# Patient Record
Sex: FEMALE | Race: WHITE | Employment: UNEMPLOYED | URBAN - METROPOLITAN AREA
[De-identification: names, ages, dates, MRNs, and addresses within clinical notes are randomized per-mention and may not be internally consistent; named-entity substitution may affect disease eponyms.]

---

## 2017-07-25 ENCOUNTER — HOSPITAL ENCOUNTER (OUTPATIENT)
Dept: MAMMOGRAPHY | Age: 62
Discharge: HOME | End: 2017-07-25
Attending: OBSTETRICS & GYNECOLOGY | Admitting: OBSTETRICS & GYNECOLOGY

## 2017-07-25 DIAGNOSIS — Z12.31 ENCOUNTER FOR SCREENING MAMMOGRAM FOR BREAST CANCER: ICD-10-CM

## 2017-10-24 ENCOUNTER — OFFICE VISIT (OUTPATIENT)
Dept: INTERNAL MEDICINE CLINIC | Age: 62
End: 2017-10-24

## 2017-10-24 ENCOUNTER — HOSPITAL ENCOUNTER (OUTPATIENT)
Dept: GENERAL RADIOLOGY | Age: 62
Discharge: HOME | End: 2017-10-24
Attending: INTERNAL MEDICINE | Admitting: INTERNAL MEDICINE

## 2017-10-24 VITALS
OXYGEN SATURATION: 96 % | HEART RATE: 82 BPM | WEIGHT: 212 LBS | HEIGHT: 63 IN | BODY MASS INDEX: 37.56 KG/M2 | DIASTOLIC BLOOD PRESSURE: 82 MMHG | SYSTOLIC BLOOD PRESSURE: 122 MMHG

## 2017-10-24 DIAGNOSIS — Z00.00 WELL ADULT EXAM: Primary | ICD-10-CM

## 2017-10-24 DIAGNOSIS — Z12.11 SCREEN FOR COLON CANCER: ICD-10-CM

## 2017-10-24 DIAGNOSIS — L98.9 SYMPTOMATIC LESION OF SKIN: ICD-10-CM

## 2017-10-24 DIAGNOSIS — Z11.59 SCREENING FOR VIRAL DISEASE: ICD-10-CM

## 2017-10-24 DIAGNOSIS — Z00.00 ROUTINE GENERAL MEDICAL EXAMINATION AT A HEALTH CARE FACILITY: ICD-10-CM

## 2017-10-24 LAB
A/G RATIO: 1.5 (ref 1.1–2.2)
ALBUMIN SERPL-MCNC: 4 G/DL (ref 3.4–5)
ALP BLD-CCNC: 57 U/L (ref 40–129)
ALT SERPL-CCNC: 17 U/L (ref 10–40)
ANION GAP SERPL CALCULATED.3IONS-SCNC: 8 MMOL/L (ref 3–16)
AST SERPL-CCNC: 18 U/L (ref 15–37)
BILIRUB SERPL-MCNC: 0.5 MG/DL (ref 0–1)
BUN BLDV-MCNC: 16 MG/DL (ref 7–20)
CALCIUM SERPL-MCNC: 9 MG/DL (ref 8.3–10.6)
CHLORIDE BLD-SCNC: 102 MMOL/L (ref 99–110)
CHOLESTEROL, TOTAL: 206 MG/DL (ref 0–199)
CO2: 29 MMOL/L (ref 21–32)
CREAT SERPL-MCNC: 0.7 MG/DL (ref 0.6–1.2)
GFR AFRICAN AMERICAN: >60
GFR NON-AFRICAN AMERICAN: >60
GLOBULIN: 2.7 G/DL
GLUCOSE BLD-MCNC: 91 MG/DL (ref 70–99)
HDLC SERPL-MCNC: 41 MG/DL (ref 40–60)
HEPATITIS C ANTIBODY INTERPRETATION: NORMAL
LDL CHOLESTEROL CALCULATED: 146 MG/DL
POTASSIUM SERPL-SCNC: 4.1 MMOL/L (ref 3.5–5.1)
SODIUM BLD-SCNC: 139 MMOL/L (ref 136–145)
TOTAL PROTEIN: 6.7 G/DL (ref 6.4–8.2)
TRIGL SERPL-MCNC: 97 MG/DL (ref 0–150)
VLDLC SERPL CALC-MCNC: 19 MG/DL

## 2017-10-24 PROCEDURE — 99396 PREV VISIT EST AGE 40-64: CPT | Performed by: INTERNAL MEDICINE

## 2017-10-24 ASSESSMENT — ENCOUNTER SYMPTOMS
ABDOMINAL DISTENTION: 0
CHEST TIGHTNESS: 0
NAUSEA: 0
SHORTNESS OF BREATH: 0
COUGH: 0
VOMITING: 0
DIARRHEA: 0
WHEEZING: 0
BACK PAIN: 0
CONSTIPATION: 0

## 2017-10-24 ASSESSMENT — PATIENT HEALTH QUESTIONNAIRE - PHQ9
SUM OF ALL RESPONSES TO PHQ QUESTIONS 1-9: 0
SUM OF ALL RESPONSES TO PHQ9 QUESTIONS 1 & 2: 0
1. LITTLE INTEREST OR PLEASURE IN DOING THINGS: 0
2. FEELING DOWN, DEPRESSED OR HOPELESS: 0

## 2017-10-24 NOTE — PROGRESS NOTES
Subjective:      Patient ID: Amita Márquez is a 62 y.o. female. HPI    Here to establish care and for a well exam. No health concerns. Past Medical History, Medications, Social History, Family History, Health Maintenance and Labs have been reviewed with Amita Márquez. Review of Systems   Constitutional: Negative for unexpected weight change. Respiratory: Negative for cough, chest tightness, shortness of breath and wheezing. Cardiovascular: Negative for chest pain, palpitations and leg swelling. Gastrointestinal: Negative for abdominal distention, constipation, diarrhea, nausea and vomiting. Musculoskeletal: Negative for arthralgias, back pain and myalgias. Neurological: Negative for tremors and numbness. All other systems reviewed and are negative. Objective:   Physical Exam   Constitutional: She is oriented to person, place, and time. She appears well-developed and well-nourished. No distress. HENT:   Right Ear: External ear normal.   Left Ear: External ear normal.   Mouth/Throat: Oropharynx is clear and moist. No oropharyngeal exudate. Neck: Neck supple. No thyromegaly present. Cardiovascular: Normal rate, regular rhythm and normal heart sounds. Exam reveals no gallop and no friction rub. No murmur heard. Pulmonary/Chest: Effort normal and breath sounds normal. No respiratory distress. She has no wheezes. She has no rales. Abdominal: Soft. She exhibits no distension. There is no tenderness. There is no rebound and no guarding. Musculoskeletal: She exhibits no edema. Neurological: She is alert and oriented to person, place, and time. Skin: She is not diaphoretic. Two skin lesions right calf with no signs of infection       Assessment:      Radha Hoffman was seen today for annual exam.    Diagnoses and all orders for this visit:    Well adult exam  Encouraged exercise and healthy diet. F/u with ob/gyn and dentist regularly. Recommend eye exam.  Wears seat belt.

## 2018-07-20 ENCOUNTER — TELEPHONE (OUTPATIENT)
Dept: INTERNAL MEDICINE CLINIC | Age: 63
End: 2018-07-20

## 2018-07-20 DIAGNOSIS — Z12.11 SCREEN FOR COLON CANCER: Primary | ICD-10-CM

## 2018-07-23 ENCOUNTER — HOSPITAL ENCOUNTER (OUTPATIENT)
Dept: WOMENS IMAGING | Age: 63
Discharge: HOME | End: 2018-07-23
Payer: COMMERCIAL

## 2018-07-23 DIAGNOSIS — Z12.31 VISIT FOR SCREENING MAMMOGRAM: ICD-10-CM

## 2018-07-23 PROCEDURE — 77063 BREAST TOMOSYNTHESIS BI: CPT

## 2018-08-07 ENCOUNTER — OFFICE VISIT (OUTPATIENT)
Dept: INTERNAL MEDICINE CLINIC | Age: 63
End: 2018-08-07

## 2018-08-07 VITALS
WEIGHT: 216 LBS | HEART RATE: 88 BPM | DIASTOLIC BLOOD PRESSURE: 80 MMHG | SYSTOLIC BLOOD PRESSURE: 122 MMHG | OXYGEN SATURATION: 96 % | BODY MASS INDEX: 38.27 KG/M2 | HEIGHT: 63 IN

## 2018-08-07 DIAGNOSIS — Z12.11 SCREEN FOR COLON CANCER: ICD-10-CM

## 2018-08-07 DIAGNOSIS — Z00.00 WELL ADULT EXAM: Primary | ICD-10-CM

## 2018-08-07 DIAGNOSIS — Z23 NEED FOR VACCINATION: ICD-10-CM

## 2018-08-07 DIAGNOSIS — Z23 NEED FOR PROPHYLACTIC VACCINATION AND INOCULATION AGAINST VARICELLA: ICD-10-CM

## 2018-08-07 PROCEDURE — 90750 HZV VACC RECOMBINANT IM: CPT | Performed by: INTERNAL MEDICINE

## 2018-08-07 PROCEDURE — 99396 PREV VISIT EST AGE 40-64: CPT | Performed by: INTERNAL MEDICINE

## 2018-08-07 PROCEDURE — 90471 IMMUNIZATION ADMIN: CPT | Performed by: INTERNAL MEDICINE

## 2018-08-07 RX ORDER — ESTRADIOL 0.04 MG/D
PATCH TRANSDERMAL
Refills: 11 | COMMUNITY
Start: 2018-07-21

## 2018-11-02 ENCOUNTER — OFFICE VISIT (OUTPATIENT)
Dept: INTERNAL MEDICINE CLINIC | Age: 63
End: 2018-11-02
Payer: COMMERCIAL

## 2018-11-02 VITALS
TEMPERATURE: 98.5 F | BODY MASS INDEX: 37.21 KG/M2 | WEIGHT: 210 LBS | SYSTOLIC BLOOD PRESSURE: 130 MMHG | HEIGHT: 63 IN | DIASTOLIC BLOOD PRESSURE: 65 MMHG

## 2018-11-02 DIAGNOSIS — Z00.00 ROUTINE GENERAL MEDICAL EXAMINATION AT A HEALTH CARE FACILITY: ICD-10-CM

## 2018-11-02 DIAGNOSIS — R04.0 BLEEDING NOSE: Primary | ICD-10-CM

## 2018-11-02 PROCEDURE — 99213 OFFICE O/P EST LOW 20 MIN: CPT | Performed by: INTERNAL MEDICINE

## 2018-11-02 ASSESSMENT — PATIENT HEALTH QUESTIONNAIRE - PHQ9
SUM OF ALL RESPONSES TO PHQ QUESTIONS 1-9: 0
SUM OF ALL RESPONSES TO PHQ QUESTIONS 1-9: 0
2. FEELING DOWN, DEPRESSED OR HOPELESS: 0
1. LITTLE INTEREST OR PLEASURE IN DOING THINGS: 0
SUM OF ALL RESPONSES TO PHQ9 QUESTIONS 1 & 2: 0

## 2018-11-05 ENCOUNTER — HOSPITAL ENCOUNTER (OUTPATIENT)
Age: 63
Discharge: HOME | End: 2018-11-05
Payer: COMMERCIAL

## 2018-11-05 DIAGNOSIS — Z00.00 ROUTINE GENERAL MEDICAL EXAMINATION AT A HEALTH CARE FACILITY: ICD-10-CM

## 2018-11-05 DIAGNOSIS — R04.0 BLEEDING NOSE: ICD-10-CM

## 2018-11-05 DIAGNOSIS — Z00.00 ROUTINE GENERAL MEDICAL EXAMINATION AT A HEALTH CARE FACILITY: Primary | ICD-10-CM

## 2018-11-05 LAB
A/G RATIO: 1.6 (ref 1.1–2.2)
ALBUMIN SERPL-MCNC: 4.2 G/DL (ref 3.4–5)
ALP BLD-CCNC: 62 U/L (ref 40–129)
ALT SERPL-CCNC: 27 U/L (ref 10–40)
ANION GAP SERPL CALCULATED.3IONS-SCNC: 14 MMOL/L (ref 3–16)
APTT: 31 SEC (ref 26–36)
AST SERPL-CCNC: 28 U/L (ref 15–37)
BASOPHILS ABSOLUTE: 0.1 K/UL (ref 0–0.2)
BASOPHILS RELATIVE PERCENT: 0.9 %
BILIRUB SERPL-MCNC: 0.5 MG/DL (ref 0–1)
BUN BLDV-MCNC: 14 MG/DL (ref 7–20)
CALCIUM SERPL-MCNC: 9.1 MG/DL (ref 8.3–10.6)
CHLORIDE BLD-SCNC: 104 MMOL/L (ref 99–110)
CHOLESTEROL, TOTAL: 204 MG/DL (ref 0–199)
CO2: 26 MMOL/L (ref 21–32)
CREAT SERPL-MCNC: 0.8 MG/DL (ref 0.6–1.2)
EOSINOPHILS ABSOLUTE: 0.1 K/UL (ref 0–0.6)
EOSINOPHILS RELATIVE PERCENT: 1.7 %
GFR AFRICAN AMERICAN: >60
GFR NON-AFRICAN AMERICAN: >60
GLOBULIN: 2.7 G/DL
GLUCOSE BLD-MCNC: 81 MG/DL (ref 70–99)
HCT VFR BLD CALC: 44.2 % (ref 36–48)
HDLC SERPL-MCNC: 41 MG/DL (ref 40–60)
HEMOGLOBIN: 14.7 G/DL (ref 12–16)
LDL CHOLESTEROL CALCULATED: 138 MG/DL
LYMPHOCYTES ABSOLUTE: 2.1 K/UL (ref 1–5.1)
LYMPHOCYTES RELATIVE PERCENT: 24.2 %
MCH RBC QN AUTO: 29 PG (ref 26–34)
MCHC RBC AUTO-ENTMCNC: 33.2 G/DL (ref 31–36)
MCV RBC AUTO: 87.5 FL (ref 80–100)
MONOCYTES ABSOLUTE: 0.7 K/UL (ref 0–1.3)
MONOCYTES RELATIVE PERCENT: 7.6 %
NEUTROPHILS ABSOLUTE: 5.6 K/UL (ref 1.7–7.7)
NEUTROPHILS RELATIVE PERCENT: 65.6 %
PDW BLD-RTO: 13.3 % (ref 12.4–15.4)
PLATELET # BLD: 306 K/UL (ref 135–450)
PMV BLD AUTO: 8.7 FL (ref 5–10.5)
POTASSIUM SERPL-SCNC: 4.3 MMOL/L (ref 3.5–5.1)
RBC # BLD: 5.05 M/UL (ref 4–5.2)
SODIUM BLD-SCNC: 144 MMOL/L (ref 136–145)
TOTAL PROTEIN: 6.9 G/DL (ref 6.4–8.2)
TRIGL SERPL-MCNC: 123 MG/DL (ref 0–150)
TSH SERPL DL<=0.05 MIU/L-ACNC: 2.61 UIU/ML (ref 0.27–4.2)
VITAMIN D 25-HYDROXY: 31.2 NG/ML
VLDLC SERPL CALC-MCNC: 25 MG/DL
WBC # BLD: 8.6 K/UL (ref 4–11)

## 2018-11-05 PROCEDURE — 84443 ASSAY THYROID STIM HORMONE: CPT

## 2018-11-05 PROCEDURE — 80053 COMPREHEN METABOLIC PANEL: CPT

## 2018-11-05 PROCEDURE — 36415 COLL VENOUS BLD VENIPUNCTURE: CPT

## 2018-11-05 PROCEDURE — 82306 VITAMIN D 25 HYDROXY: CPT

## 2018-11-05 PROCEDURE — 85730 THROMBOPLASTIN TIME PARTIAL: CPT

## 2018-11-05 PROCEDURE — 80061 LIPID PANEL: CPT

## 2018-11-05 PROCEDURE — 85025 COMPLETE CBC W/AUTO DIFF WBC: CPT

## 2018-11-05 ASSESSMENT — ENCOUNTER SYMPTOMS
COUGH: 0
VOMITING: 0
CHEST TIGHTNESS: 0
ABDOMINAL DISTENTION: 0
CONSTIPATION: 0
SHORTNESS OF BREATH: 0
DIARRHEA: 0
BACK PAIN: 0
NAUSEA: 0
WHEEZING: 0

## 2018-11-06 ENCOUNTER — OFFICE VISIT (OUTPATIENT)
Dept: ENT CLINIC | Age: 63
End: 2018-11-06
Payer: COMMERCIAL

## 2018-11-06 VITALS
BODY MASS INDEX: 37.67 KG/M2 | WEIGHT: 212.6 LBS | HEART RATE: 80 BPM | DIASTOLIC BLOOD PRESSURE: 82 MMHG | HEIGHT: 63 IN | SYSTOLIC BLOOD PRESSURE: 124 MMHG | TEMPERATURE: 97.8 F

## 2018-11-06 DIAGNOSIS — R04.0 POSTERIOR EPISTAXIS: Primary | ICD-10-CM

## 2018-11-06 PROCEDURE — 31231 NASAL ENDOSCOPY DX: CPT | Performed by: OTOLARYNGOLOGY

## 2018-11-06 PROCEDURE — 99243 OFF/OP CNSLTJ NEW/EST LOW 30: CPT | Performed by: OTOLARYNGOLOGY

## 2018-11-13 ENCOUNTER — TELEPHONE (OUTPATIENT)
Dept: INTERNAL MEDICINE CLINIC | Age: 63
End: 2018-11-13

## 2019-03-08 ENCOUNTER — NURSE ONLY (OUTPATIENT)
Dept: INTERNAL MEDICINE CLINIC | Age: 64
End: 2019-03-08
Payer: COMMERCIAL

## 2019-03-08 PROCEDURE — 90471 IMMUNIZATION ADMIN: CPT | Performed by: INTERNAL MEDICINE

## 2019-03-08 PROCEDURE — 90750 HZV VACC RECOMBINANT IM: CPT | Performed by: INTERNAL MEDICINE

## 2019-03-25 ENCOUNTER — OFFICE VISIT (OUTPATIENT)
Dept: ORTHOPEDIC SURGERY | Age: 64
End: 2019-03-25
Payer: COMMERCIAL

## 2019-03-25 VITALS — HEIGHT: 63 IN | WEIGHT: 212.52 LBS | BODY MASS INDEX: 37.66 KG/M2

## 2019-03-25 DIAGNOSIS — M17.12 PRIMARY OSTEOARTHRITIS OF LEFT KNEE: ICD-10-CM

## 2019-03-25 DIAGNOSIS — R52 PAIN: Primary | ICD-10-CM

## 2019-03-25 PROCEDURE — 99203 OFFICE O/P NEW LOW 30 MIN: CPT | Performed by: ORTHOPAEDIC SURGERY

## 2020-05-11 ENCOUNTER — HOSPITAL ENCOUNTER (OUTPATIENT)
Dept: WOMENS IMAGING | Age: 65
Discharge: HOME | End: 2020-05-11
Payer: COMMERCIAL

## 2020-05-11 PROCEDURE — 77063 BREAST TOMOSYNTHESIS BI: CPT

## 2020-07-30 ENCOUNTER — OFFICE VISIT (OUTPATIENT)
Dept: INTERNAL MEDICINE CLINIC | Age: 65
End: 2020-07-30
Payer: COMMERCIAL

## 2020-07-30 VITALS
BODY MASS INDEX: 39.38 KG/M2 | HEART RATE: 76 BPM | TEMPERATURE: 97.3 F | HEIGHT: 62 IN | OXYGEN SATURATION: 96 % | SYSTOLIC BLOOD PRESSURE: 130 MMHG | DIASTOLIC BLOOD PRESSURE: 80 MMHG | WEIGHT: 214 LBS

## 2020-07-30 PROCEDURE — 99396 PREV VISIT EST AGE 40-64: CPT | Performed by: INTERNAL MEDICINE

## 2020-07-30 ASSESSMENT — PATIENT HEALTH QUESTIONNAIRE - PHQ9
1. LITTLE INTEREST OR PLEASURE IN DOING THINGS: 0
2. FEELING DOWN, DEPRESSED OR HOPELESS: 0
SUM OF ALL RESPONSES TO PHQ QUESTIONS 1-9: 0
SUM OF ALL RESPONSES TO PHQ QUESTIONS 1-9: 0
SUM OF ALL RESPONSES TO PHQ9 QUESTIONS 1 & 2: 0

## 2020-07-30 NOTE — PROGRESS NOTES
Subjective:      Patient ID: Lucero Harvey is a 62 y.o. female. HPI     Here  for a well exam. No health concerns. Past Medical History, Medications, Social History, Family History, Health Maintenance have been reviewed with Lucero Harvey. Review of Systems   Constitutional: Negative for unexpected weight change. Respiratory: Negative for cough, chest tightness, shortness of breath and wheezing. Cardiovascular: Negative for chest pain, palpitations and leg swelling. Gastrointestinal: Negative for abdominal distention, constipation, diarrhea, nausea and vomiting. Musculoskeletal: Negative for arthralgias, back pain and myalgias. Neurological: Negative for tremors and numbness. All other systems reviewed and are negative. Objective:   Physical Exam   Constitutional: She is oriented to person, place, and time. She appears well-developed and well-nourished. No distress. HENT:   Right Ear: External ear normal.   Left Ear: External ear normal.   Mouth/Throat: Oropharynx is clear and moist. No oropharyngeal exudate. Neck: Neck supple. No thyromegaly present. Cardiovascular: Normal rate, regular rhythm and normal heart sounds. Exam reveals no gallop and no friction rub. No murmur heard. Pulmonary/Chest: Effort normal and breath sounds normal. No respiratory distress. She has no wheezes. She has no rales. Abdominal: Soft. She exhibits no distension. There is no tenderness. There is no rebound and no guarding. Musculoskeletal: She exhibits no edema. Neurological: She is alert and oriented to person, place, and time. Skin: She is not diaphoretic. Assessment:        Glenn Newman was seen today for annual exam.    Diagnoses and all orders for this visit:    Well adult exam  Encouraged exercise and healthy diet. F/u with ob/gyn and dentist regularly. Recommend eye exam.  Wears seat belt. Continue medications.            Plan:         See above plan

## 2020-09-14 ENCOUNTER — TELEPHONE (OUTPATIENT)
Dept: INTERNAL MEDICINE CLINIC | Age: 65
End: 2020-09-14

## 2020-09-14 NOTE — TELEPHONE ENCOUNTER
Patient had a physical on 7/30/20, but not any blood work was ordered. She asks if Dr. Ellwood Brunner can please order labs that are necessary for a physical.  Lipids, CMP,BMP  ? ??? She said the [facility] has ordered TSH, Vit D and OH panel. She plans to get her blood drawn on Thursday.

## 2020-09-18 ENCOUNTER — HOSPITAL ENCOUNTER (OUTPATIENT)
Dept: WOMENS IMAGING | Age: 65
Discharge: HOME | End: 2020-09-18
Payer: COMMERCIAL

## 2020-09-18 PROCEDURE — 77080 DXA BONE DENSITY AXIAL: CPT

## 2020-09-23 ENCOUNTER — HOSPITAL ENCOUNTER (OUTPATIENT)
Age: 65
Discharge: HOME | End: 2020-09-23
Payer: COMMERCIAL

## 2020-09-23 LAB
A/G RATIO: 1.8 (ref 1.1–2.2)
ALBUMIN SERPL-MCNC: 4.1 G/DL (ref 3.4–5)
ALP BLD-CCNC: 71 U/L (ref 40–129)
ALT SERPL-CCNC: 22 U/L (ref 10–40)
ANION GAP SERPL CALCULATED.3IONS-SCNC: 11 MMOL/L (ref 3–16)
AST SERPL-CCNC: 32 U/L (ref 15–37)
BILIRUB SERPL-MCNC: 0.4 MG/DL (ref 0–1)
BUN BLDV-MCNC: 13 MG/DL (ref 7–20)
CALCIUM SERPL-MCNC: 9.1 MG/DL (ref 8.3–10.6)
CHLORIDE BLD-SCNC: 106 MMOL/L (ref 99–110)
CHOLESTEROL, TOTAL: 203 MG/DL (ref 0–199)
CO2: 24 MMOL/L (ref 21–32)
CREAT SERPL-MCNC: 0.7 MG/DL (ref 0.6–1.2)
ESTRADIOL LEVEL: 31 PG/ML
GFR AFRICAN AMERICAN: >60
GFR NON-AFRICAN AMERICAN: >60
GLOBULIN: 2.3 G/DL
GLUCOSE BLD-MCNC: 89 MG/DL (ref 70–99)
HDLC SERPL-MCNC: 41 MG/DL (ref 40–60)
LDL CHOLESTEROL CALCULATED: 131 MG/DL
POTASSIUM SERPL-SCNC: 4.3 MMOL/L (ref 3.5–5.1)
SODIUM BLD-SCNC: 141 MMOL/L (ref 136–145)
TOTAL PROTEIN: 6.4 G/DL (ref 6.4–8.2)
TRIGL SERPL-MCNC: 155 MG/DL (ref 0–150)
TSH REFLEX: 2.01 UIU/ML (ref 0.27–4.2)
VITAMIN D 25-HYDROXY: 54.2 NG/ML
VLDLC SERPL CALC-MCNC: 31 MG/DL

## 2020-09-23 PROCEDURE — 80061 LIPID PANEL: CPT

## 2020-09-23 PROCEDURE — 80053 COMPREHEN METABOLIC PANEL: CPT

## 2020-09-23 PROCEDURE — 82306 VITAMIN D 25 HYDROXY: CPT

## 2020-09-23 PROCEDURE — 82670 ASSAY OF TOTAL ESTRADIOL: CPT

## 2020-09-23 PROCEDURE — 36415 COLL VENOUS BLD VENIPUNCTURE: CPT

## 2020-09-23 PROCEDURE — 84443 ASSAY THYROID STIM HORMONE: CPT
